# Patient Record
Sex: FEMALE | Race: WHITE | NOT HISPANIC OR LATINO
[De-identification: names, ages, dates, MRNs, and addresses within clinical notes are randomized per-mention and may not be internally consistent; named-entity substitution may affect disease eponyms.]

---

## 2023-12-06 PROBLEM — Z00.00 ENCOUNTER FOR PREVENTIVE HEALTH EXAMINATION: Status: ACTIVE | Noted: 2023-12-06

## 2023-12-11 ENCOUNTER — APPOINTMENT (OUTPATIENT)
Dept: ORTHOPEDIC SURGERY | Facility: CLINIC | Age: 58
End: 2023-12-11
Payer: COMMERCIAL

## 2023-12-11 VITALS — BODY MASS INDEX: 31.01 KG/M2 | HEIGHT: 63 IN | WEIGHT: 175 LBS

## 2023-12-11 DIAGNOSIS — Z78.9 OTHER SPECIFIED HEALTH STATUS: ICD-10-CM

## 2023-12-11 PROCEDURE — 73562 X-RAY EXAM OF KNEE 3: CPT | Mod: RT

## 2023-12-11 PROCEDURE — 99213 OFFICE O/P EST LOW 20 MIN: CPT

## 2023-12-11 RX ORDER — LOSARTAN POTASSIUM AND HYDROCHLOROTHIAZIDE 12.5; 1 MG/1; MG/1
100-12.5 TABLET ORAL
Refills: 0 | Status: ACTIVE | COMMUNITY

## 2023-12-11 RX ORDER — SIMVASTATIN 20 MG/1
20 TABLET, FILM COATED ORAL
Refills: 0 | Status: ACTIVE | COMMUNITY

## 2023-12-14 ENCOUNTER — APPOINTMENT (OUTPATIENT)
Dept: MRI IMAGING | Facility: CLINIC | Age: 58
End: 2023-12-14
Payer: COMMERCIAL

## 2023-12-14 PROCEDURE — 73721 MRI JNT OF LWR EXTRE W/O DYE: CPT | Mod: RT

## 2023-12-18 ENCOUNTER — APPOINTMENT (OUTPATIENT)
Dept: ORTHOPEDIC SURGERY | Facility: CLINIC | Age: 58
End: 2023-12-18
Payer: COMMERCIAL

## 2023-12-18 VITALS — BODY MASS INDEX: 31.01 KG/M2 | HEIGHT: 63 IN | WEIGHT: 175 LBS

## 2023-12-18 PROCEDURE — 99213 OFFICE O/P EST LOW 20 MIN: CPT | Mod: 25

## 2023-12-18 PROCEDURE — 20610 DRAIN/INJ JOINT/BURSA W/O US: CPT | Mod: RT

## 2023-12-18 NOTE — ASSESSMENT
[FreeTextEntry1] : I explained to her that this hopefully the shot will give her some temporary relief but she really needs to consider having arthroscopy of the knee due to the complex medial meniscus tear and patellofemoral symptoms on her scan she will consider that option and will be rechecked in a couple weeks unless she decides to schedule the procedure.

## 2023-12-18 NOTE — REASON FOR VISIT
[FreeTextEntry2] : Patient complains of increased R Knee pain since her last visit. Pain is constant and increases when walking. Patient would like to discuss MRI results.///// patient returns to the office in follow-up of her right knee she describes minimal improvement since the previous visit her MRI that was performed revealed a complex tear in the medial meniscus in addition to some degenerative changes in the patellofemoral joint with some lateral subluxation examination today reveals ambulates with a mild antalgic gait range of motion is limited by pain to 0 to 110 degrees.  She is tender on the medial joint line she also has some patellofemoral compression pain and lateral subluxation to examination

## 2023-12-18 NOTE — PROCEDURE
[FreeTextEntry3] : Mixture of 1 cc of lidocaine and 1 cc of dexamethasone mixed together the knee area was cleaned off with alcohol and sprayed with ethyl chloride and subsequently injection was inserted into her knee joint patient tolerated the procedure well and felt a little improvement after the shot

## 2023-12-18 NOTE — HISTORY OF PRESENT ILLNESS
[Gradual] : gradual [10] : 10 [Dull/Aching] : dull/aching [Sharp] : sharp [Constant] : constant [Nothing helps with pain getting better] : Nothing helps with pain getting better [Walking] : walking [] : Post Surgical Visit: no [de-identified] : 12/11/23 [de-identified] : Dr. Jansen [de-identified] : MRI

## 2024-01-18 ENCOUNTER — APPOINTMENT (OUTPATIENT)
Dept: ORTHOPEDIC SURGERY | Facility: HOSPITAL | Age: 59
End: 2024-01-18

## 2024-01-19 ENCOUNTER — APPOINTMENT (OUTPATIENT)
Dept: ORTHOPEDIC SURGERY | Facility: CLINIC | Age: 59
End: 2024-01-19

## 2024-01-26 ENCOUNTER — OUTPATIENT (OUTPATIENT)
Dept: OUTPATIENT SERVICES | Facility: HOSPITAL | Age: 59
LOS: 1 days | End: 2024-01-26
Payer: COMMERCIAL

## 2024-01-26 VITALS
DIASTOLIC BLOOD PRESSURE: 85 MMHG | WEIGHT: 181 LBS | TEMPERATURE: 98 F | OXYGEN SATURATION: 95 % | RESPIRATION RATE: 14 BRPM | HEIGHT: 63 IN | SYSTOLIC BLOOD PRESSURE: 147 MMHG | HEART RATE: 78 BPM

## 2024-01-26 DIAGNOSIS — S83.241A OTHER TEAR OF MEDIAL MENISCUS, CURRENT INJURY, RIGHT KNEE, INITIAL ENCOUNTER: ICD-10-CM

## 2024-01-26 DIAGNOSIS — Z98.890 OTHER SPECIFIED POSTPROCEDURAL STATES: Chronic | ICD-10-CM

## 2024-01-26 DIAGNOSIS — Z01.818 ENCOUNTER FOR OTHER PREPROCEDURAL EXAMINATION: ICD-10-CM

## 2024-01-26 DIAGNOSIS — Z98.891 HISTORY OF UTERINE SCAR FROM PREVIOUS SURGERY: Chronic | ICD-10-CM

## 2024-01-26 LAB
ALBUMIN SERPL ELPH-MCNC: 4.4 G/DL — SIGNIFICANT CHANGE UP (ref 3.3–5)
ALP SERPL-CCNC: 52 U/L — SIGNIFICANT CHANGE UP (ref 40–120)
ALT FLD-CCNC: 38 U/L — SIGNIFICANT CHANGE UP (ref 12–78)
ANION GAP SERPL CALC-SCNC: 1 MMOL/L — LOW (ref 5–17)
AST SERPL-CCNC: 21 U/L — SIGNIFICANT CHANGE UP (ref 15–37)
BILIRUB SERPL-MCNC: 0.4 MG/DL — SIGNIFICANT CHANGE UP (ref 0.2–1.2)
BUN SERPL-MCNC: 20 MG/DL — SIGNIFICANT CHANGE UP (ref 7–23)
CALCIUM SERPL-MCNC: 10 MG/DL — SIGNIFICANT CHANGE UP (ref 8.5–10.1)
CHLORIDE SERPL-SCNC: 110 MMOL/L — HIGH (ref 96–108)
CO2 SERPL-SCNC: 30 MMOL/L — SIGNIFICANT CHANGE UP (ref 22–31)
CREAT SERPL-MCNC: 0.76 MG/DL — SIGNIFICANT CHANGE UP (ref 0.5–1.3)
EGFR: 90 ML/MIN/1.73M2 — SIGNIFICANT CHANGE UP
GLUCOSE SERPL-MCNC: 98 MG/DL — SIGNIFICANT CHANGE UP (ref 70–99)
HCT VFR BLD CALC: 43.8 % — SIGNIFICANT CHANGE UP (ref 34.5–45)
HGB BLD-MCNC: 14.4 G/DL — SIGNIFICANT CHANGE UP (ref 11.5–15.5)
MCHC RBC-ENTMCNC: 30.8 PG — SIGNIFICANT CHANGE UP (ref 27–34)
MCHC RBC-ENTMCNC: 32.9 GM/DL — SIGNIFICANT CHANGE UP (ref 32–36)
MCV RBC AUTO: 93.8 FL — SIGNIFICANT CHANGE UP (ref 80–100)
NRBC # BLD: 0 /100 WBCS — SIGNIFICANT CHANGE UP (ref 0–0)
PLATELET # BLD AUTO: 224 K/UL — SIGNIFICANT CHANGE UP (ref 150–400)
POTASSIUM SERPL-MCNC: 4.1 MMOL/L — SIGNIFICANT CHANGE UP (ref 3.5–5.3)
POTASSIUM SERPL-SCNC: 4.1 MMOL/L — SIGNIFICANT CHANGE UP (ref 3.5–5.3)
PROT SERPL-MCNC: 7.5 G/DL — SIGNIFICANT CHANGE UP (ref 6–8.3)
RBC # BLD: 4.67 M/UL — SIGNIFICANT CHANGE UP (ref 3.8–5.2)
RBC # FLD: 13.2 % — SIGNIFICANT CHANGE UP (ref 10.3–14.5)
SODIUM SERPL-SCNC: 141 MMOL/L — SIGNIFICANT CHANGE UP (ref 135–145)
WBC # BLD: 4.5 K/UL — SIGNIFICANT CHANGE UP (ref 3.8–10.5)
WBC # FLD AUTO: 4.5 K/UL — SIGNIFICANT CHANGE UP (ref 3.8–10.5)

## 2024-01-26 PROCEDURE — 80053 COMPREHEN METABOLIC PANEL: CPT

## 2024-01-26 PROCEDURE — 93005 ELECTROCARDIOGRAM TRACING: CPT

## 2024-01-26 PROCEDURE — G0463: CPT

## 2024-01-26 PROCEDURE — 85027 COMPLETE CBC AUTOMATED: CPT

## 2024-01-26 PROCEDURE — 36415 COLL VENOUS BLD VENIPUNCTURE: CPT

## 2024-01-26 PROCEDURE — 93010 ELECTROCARDIOGRAM REPORT: CPT

## 2024-01-26 NOTE — H&P PST ADULT - HISTORY OF PRESENT ILLNESS
58 year old female PMH HTN, Hypercholesterolemia, COVID-19 (X3); now with  Other Tear Medial  Meniscus Current Injury RIGHT knee Initial Encounter; presents today for PST prior to RIGHT Knee Arthroscopy - Medial Meniscectomy- Lateral Release with Dr Joe Jansen on 2/1/2-24.     Pt notes back in June she went to both [place] and [place] and walked LOT over 20 days. Pt then notes  in October she began to develop RIGHT knee pain for which she began taking Aleve. Pt notes this gave her little relief and pain got worse and her right knee became swollen. Notes decreased ROM and strength to right knee. Notes pain to inner aspect of right knee along with difficulty walking and using stairs as well as being on her feet for long periods of time while at work. Pt has been wearing a brace on her right knee. Pt notes she went herself for consult with Dr Jansen then had an MRI which showed Meniscus tear. Pt notes she has had one prior Cortisone injection with relief noted for a while however pain then came back. Pt notes this is a quality of life issue for her and following discussions with Dr Jansen regarding treatment options pt is electing for scheduled procedure.

## 2024-01-26 NOTE — H&P PST ADULT - NSICDXPASTMEDICALHX_GEN_ALL_CORE_FT
PAST MEDICAL HISTORY:  2019 novel coronavirus disease (COVID-19)     Hypercholesterolemia     Hypertension     Other tear of medial meniscus, current injury, right knee, initial encounter

## 2024-01-26 NOTE — H&P PST ADULT - NSANTHOSAYNRD_GEN_A_CORE
No. BO screening performed.  STOP BANG Legend: 0-2 = LOW Risk; 3-4 = INTERMEDIATE Risk; 5-8 = HIGH Risk

## 2024-01-26 NOTE — H&P PST ADULT - PROBLEM SELECTOR PLAN 1
PST labs; CBC, CMP, EKG. Medical clearance scheduled with PCP Dr Terrance Gómez on 1/30/2024. Will fax over PST results to PCP for review and medical clearance. Pt instructed to stop any NSAIDS/Herbal Supplements between now and procedure. May take Tylenol if needed for any pain between now and procedure. No medications needed morning of procedure. She will continue her night medications as per her usual routine. Pre-op instructions as well as pre-op wash instructions given to pt with understanding verbalized. All questions addressed with pt prior to her leaving the PST department today.

## 2024-01-26 NOTE — H&P PST ADULT - MUSCULOSKELETAL COMMENTS
Notes herniated Lumbar Discs - also notes RIGHT knee pain/decreased ROM secondary to tear - SEE HPI RIGHT Knee Pain secondary to meniscus tear - decreased ROM and strength - increased pain to inner aspect of right knee  - pain when going up and down stairs

## 2024-01-26 NOTE — H&P PST ADULT - NSICDXFAMILYHX_GEN_ALL_CORE_FT
FAMILY HISTORY:  Father  Still living? No  Family history of coronary artery disease in father, Age at diagnosis: Age Unknown    Mother  Still living? Yes, Estimated age: 81-90  Family history of coronary artery disease in mother, Age at diagnosis: Age Unknown

## 2024-01-26 NOTE — H&P PST ADULT - ASSESSMENT
58 year old female PMH HTN, Hypercholesterolemia, COVID-19 (X3); now with  Other Tear Medial  Meniscus Current Injury RIGHT knee Initial Encounter; scheduled for RIGHT Knee Arthroscopy - Medial Meniscectomy- Lateral Release with Dr Joe Jansen on 2/1/2-24.     Pt notes back in June she went to both [place] and [place] and walked LOT over 20 days. Pt then notes  in October she began to develop RIGHT knee pain for which she began taking Aleve. Pt notes this gave her little relief and pain got worse and her right knee became swollen. Notes decreased ROM and strength to right knee. Notes pain to inner aspect of right knee along with difficulty walking and using stairs as well as being on her feet for long periods of time while at work. Pt has been wearing a brace on her right knee. Pt notes she went herself for consult with Dr Jansen then had an MRI which showed Meniscus tear. Pt notes she has had one prior Cortisone injection with relief noted for a while however pain then came back. Pt notes this is a quality of life issue for her and following discussions with Dr Jansen regarding treatment options pt is electing for scheduled procedure.

## 2024-01-29 RX ORDER — MORPHINE SULFATE 50 MG/1
4 CAPSULE, EXTENDED RELEASE ORAL ONCE
Refills: 0 | Status: DISCONTINUED | OUTPATIENT
Start: 2024-02-01 | End: 2024-02-15

## 2024-01-31 ENCOUNTER — TRANSCRIPTION ENCOUNTER (OUTPATIENT)
Age: 59
End: 2024-01-31

## 2024-01-31 NOTE — ASU PATIENT PROFILE, ADULT - FALL HARM RISK - UNIVERSAL INTERVENTIONS
Bed in lowest position, wheels locked, appropriate side rails in place/Call bell, personal items and telephone in reach/Instruct patient to call for assistance before getting out of bed or chair/Non-slip footwear when patient is out of bed/[place] to call system/Physically safe environment - no spills, clutter or unnecessary equipment/Purposeful Proactive Rounding/Room/bathroom lighting operational, light cord in reach

## 2024-02-01 ENCOUNTER — APPOINTMENT (OUTPATIENT)
Dept: ORTHOPEDIC SURGERY | Facility: HOSPITAL | Age: 59
End: 2024-02-01
Payer: COMMERCIAL

## 2024-02-01 ENCOUNTER — TRANSCRIPTION ENCOUNTER (OUTPATIENT)
Age: 59
End: 2024-02-01

## 2024-02-01 ENCOUNTER — OUTPATIENT (OUTPATIENT)
Dept: OUTPATIENT SERVICES | Facility: HOSPITAL | Age: 59
LOS: 1 days | End: 2024-02-01
Payer: COMMERCIAL

## 2024-02-01 ENCOUNTER — RESULT REVIEW (OUTPATIENT)
Age: 59
End: 2024-02-01

## 2024-02-01 VITALS
HEIGHT: 63 IN | RESPIRATION RATE: 16 BRPM | OXYGEN SATURATION: 96 % | DIASTOLIC BLOOD PRESSURE: 71 MMHG | WEIGHT: 181 LBS | SYSTOLIC BLOOD PRESSURE: 113 MMHG | HEART RATE: 85 BPM | TEMPERATURE: 99 F

## 2024-02-01 VITALS
HEART RATE: 87 BPM | DIASTOLIC BLOOD PRESSURE: 64 MMHG | RESPIRATION RATE: 17 BRPM | SYSTOLIC BLOOD PRESSURE: 114 MMHG | OXYGEN SATURATION: 97 %

## 2024-02-01 DIAGNOSIS — Z98.890 OTHER SPECIFIED POSTPROCEDURAL STATES: Chronic | ICD-10-CM

## 2024-02-01 DIAGNOSIS — Z98.891 HISTORY OF UTERINE SCAR FROM PREVIOUS SURGERY: Chronic | ICD-10-CM

## 2024-02-01 DIAGNOSIS — Z01.818 ENCOUNTER FOR OTHER PREPROCEDURAL EXAMINATION: ICD-10-CM

## 2024-02-01 DIAGNOSIS — S83.241A OTHER TEAR OF MEDIAL MENISCUS, CURRENT INJURY, RIGHT KNEE, INITIAL ENCOUNTER: ICD-10-CM

## 2024-02-01 PROCEDURE — 29873 ARTHRS KNEE SURG W/LAT RLS: CPT | Mod: RT

## 2024-02-01 PROCEDURE — 88304 TISSUE EXAM BY PATHOLOGIST: CPT

## 2024-02-01 PROCEDURE — 88304 TISSUE EXAM BY PATHOLOGIST: CPT | Mod: 26

## 2024-02-01 PROCEDURE — 29875 ARTHRS KNEE SURG SYNVCT LMTD: CPT | Mod: 59,RT

## 2024-02-01 PROCEDURE — 29881 ARTHRS KNE SRG MNISECTMY M/L: CPT | Mod: RT

## 2024-02-01 PROCEDURE — 29873 ARTHRS KNEE SURG W/LAT RLS: CPT | Mod: 59,RT

## 2024-02-01 RX ORDER — OXYCODONE HYDROCHLORIDE 5 MG/1
1 TABLET ORAL
Qty: 10 | Refills: 0
Start: 2024-02-01

## 2024-02-01 RX ORDER — LOSARTAN/HYDROCHLOROTHIAZIDE 100MG-25MG
1 TABLET ORAL
Refills: 0 | DISCHARGE

## 2024-02-01 RX ORDER — OXYCODONE HYDROCHLORIDE 5 MG/1
5 TABLET ORAL ONCE
Refills: 0 | Status: DISCONTINUED | OUTPATIENT
Start: 2024-02-01 | End: 2024-02-01

## 2024-02-01 RX ORDER — HYDROMORPHONE HYDROCHLORIDE 2 MG/ML
0.5 INJECTION INTRAMUSCULAR; INTRAVENOUS; SUBCUTANEOUS
Refills: 0 | Status: DISCONTINUED | OUTPATIENT
Start: 2024-02-01 | End: 2024-02-01

## 2024-02-01 RX ORDER — SODIUM CHLORIDE 9 MG/ML
1000 INJECTION, SOLUTION INTRAVENOUS
Refills: 0 | Status: DISCONTINUED | OUTPATIENT
Start: 2024-02-01 | End: 2024-02-01

## 2024-02-01 RX ORDER — METOCLOPRAMIDE HCL 10 MG
5 TABLET ORAL ONCE
Refills: 0 | Status: COMPLETED | OUTPATIENT
Start: 2024-02-01 | End: 2024-02-01

## 2024-02-01 RX ORDER — SIMVASTATIN 20 MG/1
1 TABLET, FILM COATED ORAL
Refills: 0 | DISCHARGE

## 2024-02-01 RX ADMIN — SODIUM CHLORIDE 50 MILLILITER(S): 9 INJECTION, SOLUTION INTRAVENOUS at 10:01

## 2024-02-01 RX ADMIN — Medication 5 MILLIGRAM(S): at 13:13

## 2024-02-01 RX ADMIN — HYDROMORPHONE HYDROCHLORIDE 0.5 MILLIGRAM(S): 2 INJECTION INTRAMUSCULAR; INTRAVENOUS; SUBCUTANEOUS at 12:09

## 2024-02-01 RX ADMIN — HYDROMORPHONE HYDROCHLORIDE 0.5 MILLIGRAM(S): 2 INJECTION INTRAMUSCULAR; INTRAVENOUS; SUBCUTANEOUS at 11:30

## 2024-02-01 RX ADMIN — HYDROMORPHONE HYDROCHLORIDE 0.5 MILLIGRAM(S): 2 INJECTION INTRAMUSCULAR; INTRAVENOUS; SUBCUTANEOUS at 11:54

## 2024-02-01 RX ADMIN — SODIUM CHLORIDE 120 MILLILITER(S): 9 INJECTION, SOLUTION INTRAVENOUS at 11:29

## 2024-02-01 RX ADMIN — HYDROMORPHONE HYDROCHLORIDE 0.5 MILLIGRAM(S): 2 INJECTION INTRAMUSCULAR; INTRAVENOUS; SUBCUTANEOUS at 11:45

## 2024-02-01 NOTE — ASU DISCHARGE PLAN (ADULT/PEDIATRIC) - NS MD DC FALL RISK RISK
For information on Fall & Injury Prevention, visit: [email or web address] OR  [email or web address] OR  https://www.cdc.gov/steadi/patient.html

## 2024-02-01 NOTE — ASU DISCHARGE PLAN (ADULT/PEDIATRIC) - ASU DC SPECIAL INSTRUCTIONSFT
Knee Arthroscopy Instructions    1) Your knee will swell over the next 48hours and you can expect pain to get a bit worse. Ice your Knee plenty, continuously if possible. Fill up a plastic bag, then wrap it in a towel or pillow case.     2) Elevate your leg above your heart with about 3 pillows when you can, when you are in bed or chair. Otherwise you should be up and about, walking as much as you can tolereate. The more you move the better you will do. Weight bearing as tolerated.    3) Expect some bloody drainage.      4) Dressing to be changed tomorrow during the office visit     5) Only reason to worry would be if pain got so severe that you cannot feel or wiggle your toes, or if your foot is cold. In this case you need to call or come to the ER. But as long as you can feel and wiggle your toes, you are fine.    6) Office appointment set up for tomorrow     7) A pain Rx is in the chart; fill it on your way home. OR was sent electronically to your pharmacy, pick it up on the way home. Knee Arthroscopy Instructions    1) Your knee will swell over the next 48hours and you can expect pain to get a bit worse. Ice your Knee plenty, continuously if possible. Fill up a plastic bag, then wrap it in a towel or pillow case.     2) Elevate your leg above your heart with about 3 pillows when you can, when you are in bed or chair. Otherwise you should be up and about, walking as much as you can tolerate. The more you move the better you will do. Weight bearing as tolerated.    3) Expect some bloody drainage.      4) Dressing to be changed tomorrow during the office visit     5) Only reason to worry would be if pain got so severe that you cannot feel or wiggle your toes, or if your foot is cold. In this case you need to call or come to the ER. But as long as you can feel and wiggle your toes, you are fine.    6) Office appointment set up for tomorrow     7) A pain Rx is in the chart; fill it on your way home. OR was sent electronically to your pharmacy, pick it up on the way home.

## 2024-02-01 NOTE — ASU DISCHARGE PLAN (ADULT/PEDIATRIC) - CARE PROVIDER_API CALL
Joe Jansen  Orthopaedic Surgery  [address] [place] [address]  Phone: [phone]  Fax: [phone]  Established Patient  Follow Up Time:

## 2024-02-02 ENCOUNTER — APPOINTMENT (OUTPATIENT)
Dept: ORTHOPEDIC SURGERY | Facility: CLINIC | Age: 59
End: 2024-02-02
Payer: COMMERCIAL

## 2024-02-02 VITALS — BODY MASS INDEX: 31.01 KG/M2 | HEIGHT: 63 IN | WEIGHT: 175 LBS

## 2024-02-02 PROBLEM — U07.1 COVID-19: Chronic | Status: ACTIVE | Noted: 2024-01-26

## 2024-02-02 PROBLEM — E78.00 PURE HYPERCHOLESTEROLEMIA, UNSPECIFIED: Chronic | Status: ACTIVE | Noted: 2024-01-26

## 2024-02-02 PROCEDURE — 99024 POSTOP FOLLOW-UP VISIT: CPT

## 2024-02-02 NOTE — ASSESSMENT
[FreeTextEntry1] : Patient is dressing is changed and she will maintain Band-Aids over the portals which will be changed once a day will begin therapy on Monday and return in 1 week for follow-up and suture removal she will take Aleve twice a day and take the pain medicine if necessary at night.

## 2024-02-02 NOTE — HISTORY OF PRESENT ILLNESS
[] : no [FreeTextEntry1] : R Knee  [de-identified] : 12/18/2023 [de-identified] : Dr. Jansen  [de-identified] : 2/1/2024 [de-identified] : MRI [de-identified] : 2/1/2024 [de-identified] : R Knee Arthroscopy

## 2024-02-02 NOTE — HISTORY OF PRESENT ILLNESS
[] : no [FreeTextEntry1] : R Knee  [de-identified] : 12/18/2023 [de-identified] : Dr. Jansen  [de-identified] : 2/1/2024 [de-identified] : MRI [de-identified] : 2/1/2024 [de-identified] : R Knee Arthroscopy

## 2024-02-02 NOTE — REASON FOR VISIT
[Family Member] : family member [FreeTextEntry2] : First Post Op Patient returns to the office 1 day postop her right knee surgery yesterday her pain is relatively mild she is using cane to ambulate with a mild antalgic gait dressing is changed and her wounds are benign range of motion is 0 to 80 degrees she has good patellofemoral tracking SX 2/1/2024       Patient returns to the office 1 day status post her right knee surgery she is using a cane to ambulate with a mild antalgic gait her dressing is changed and her wound is benign with mild swelling she can flex her knee from 0 to approximately 80 degrees.  Quad strength 3 out of 5 good patellofemoral tracking

## 2024-02-05 LAB — SURGICAL PATHOLOGY STUDY: SIGNIFICANT CHANGE UP

## 2024-02-09 ENCOUNTER — APPOINTMENT (OUTPATIENT)
Dept: ORTHOPEDIC SURGERY | Facility: CLINIC | Age: 59
End: 2024-02-09
Payer: COMMERCIAL

## 2024-02-09 VITALS — WEIGHT: 175 LBS | BODY MASS INDEX: 31.01 KG/M2 | HEIGHT: 63 IN

## 2024-02-09 PROBLEM — I10 ESSENTIAL (PRIMARY) HYPERTENSION: Chronic | Status: ACTIVE | Noted: 2024-01-26

## 2024-02-09 PROBLEM — S83.241A OTHER TEAR OF MEDIAL MENISCUS, CURRENT INJURY, RIGHT KNEE, INITIAL ENCOUNTER: Chronic | Status: ACTIVE | Noted: 2024-01-26

## 2024-02-09 PROCEDURE — 99024 POSTOP FOLLOW-UP VISIT: CPT

## 2024-02-09 NOTE — REASON FOR VISIT
[FreeTextEntry2] : 2ND POST APPT  SX 2/1/2024 Patient returns to the office 1 week status post her right knee arthroscopic surgery.  Presently her pain is minimal and she is gradually progressing with her physical therapy examination today reveals ambulates with a mild antalgic gait when she slows her gait down she walks fairly normally right knee has minimal swelling with range of motion 0 to 120 degrees she has no gross instability or meniscal signs

## 2024-02-09 NOTE — HISTORY OF PRESENT ILLNESS
[Full time] : Work status: full time [] : no [FreeTextEntry1] : R Knee  [de-identified] : 2/2/2024 [de-identified] : Dr. 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

## 2024-02-09 NOTE — HISTORY OF PRESENT ILLNESS
[Full time] : Work status: full time [] : no [FreeTextEntry1] : R Knee  [de-identified] : 2/2/2024 [de-identified] : Dr. 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

## 2024-02-09 NOTE — ASSESSMENT
[FreeTextEntry1] : Patient is doing very well 1 week postop her knee surgery.  She will continue working with the physical therapist to increase her motion and strength and normalize her gait her sutures are removed today and her wounds are benign she will follow-up in approximately 2 weeks.  She will likely be returned to work after next visit.

## 2024-02-14 ENCOUNTER — NON-APPOINTMENT (OUTPATIENT)
Age: 59
End: 2024-02-14

## 2024-02-23 ENCOUNTER — APPOINTMENT (OUTPATIENT)
Dept: ORTHOPEDIC SURGERY | Facility: CLINIC | Age: 59
End: 2024-02-23
Payer: COMMERCIAL

## 2024-02-23 VITALS — HEIGHT: 63 IN | BODY MASS INDEX: 31.01 KG/M2 | WEIGHT: 175 LBS

## 2024-02-23 PROCEDURE — 99213 OFFICE O/P EST LOW 20 MIN: CPT | Mod: 24

## 2024-02-23 NOTE — ASSESSMENT
[FreeTextEntry1] : Patient is doing very well 2 weeks postop her knee surgery.  She will continue working with the physical therapist to increase her motion and strength and normalize her gait  Since she does a lot of walking at her job she cannot do that at this point in time she will continue to work from home and return in 2 weeks hopefully will be able to get her to return to work after her next visit even if we have to limit some activities.

## 2024-02-23 NOTE — REASON FOR VISIT
[FreeTextEntry2] : 3RD POST APPT  SX 2/1/2024 Patient returns to the office approximately 2 weeks status post her knee surgery presently her pain is minimal and she is ambulate with a slight antalgic gait swelling is minimal and range of motion is 0 to 120 degrees she has no gross instability or meniscal signs quad strength is 4 out of 5

## 2024-02-23 NOTE — HISTORY OF PRESENT ILLNESS
[Tightness] : tightness [Intermittent] : intermittent [Household chores] : household chores [Leisure] : leisure [Rest] : rest [Physical therapy] : physical therapy [Full time] : Work status: full time [] : no [FreeTextEntry1] : R Knee  [de-identified] : 2/9/2024 [de-identified] : Dr. 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

## 2024-03-08 ENCOUNTER — APPOINTMENT (OUTPATIENT)
Dept: ORTHOPEDIC SURGERY | Facility: CLINIC | Age: 59
End: 2024-03-08
Payer: COMMERCIAL

## 2024-03-08 VITALS — WEIGHT: 175 LBS | HEIGHT: 63 IN | BODY MASS INDEX: 31.01 KG/M2

## 2024-03-08 DIAGNOSIS — S83.241A OTHER TEAR OF MEDIAL MENISCUS, CURRENT INJURY, RIGHT KNEE, INITIAL ENCOUNTER: ICD-10-CM

## 2024-03-08 PROCEDURE — 99213 OFFICE O/P EST LOW 20 MIN: CPT | Mod: 24

## 2024-03-08 NOTE — REASON FOR VISIT
[FreeTextEntry2] : 3RD POST APPT  SX 2/1/2024 Patient returns to the office in follow-up regarding her right knee she is doing much better since last visit but is still not returned to work in person examination of the right knee today reveals minimal swelling with wounds healing nicely range of motion is 0 to 125 degrees Quad strength is 4 out of 5 she now has her patella taped and she is to be having improved alignment with that and is also having less pain and better ambulation. Patient returns to the office approximately 2 weeks status post her knee surgery presently her pain is minimal and she is ambulate with a slight antalgic gait swelling is minimal and range of motion is 0 to 120 degrees she has no gross instability or meniscal signs quad strength is 4 out of 5

## 2024-03-08 NOTE — HISTORY OF PRESENT ILLNESS
[Tightness] : tightness [Intermittent] : intermittent [Household chores] : household chores [Leisure] : leisure [Physical therapy] : physical therapy [Rest] : rest [Full time] : Work status: full time [] : no [FreeTextEntry1] : R Knee  [de-identified] : 2/23/2024 [de-identified] : Dr. 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

## 2024-03-08 NOTE — ASSESSMENT
[FreeTextEntry1] : Patient is doing very well 4weeks postop her knee surgery.  She will continue working with the physical therapist to increase her motion and strength and normalize her gait  Since she does a lot of walking at her job she cannot do that at this point in time she will continue to work from home and return to work 3/20  Short-acting recheck.  She will be rechecked in 3 to 4 weeks after returning to her job to her job to monitor her progress and functional level..

## 2024-04-29 ENCOUNTER — APPOINTMENT (OUTPATIENT)
Dept: ORTHOPEDIC SURGERY | Facility: CLINIC | Age: 59
End: 2024-04-29

## 2024-11-18 NOTE — ASU PREOP CHECKLIST - CHLOROHEXIDINE WASH 1
Outpatient Occupational Therapy  DAILY TREATMENT     [facility] Occupational Therapy [address]  [address]  [place] [address]  Phone:  [phone]  Fax:  [phone]    Date: 11/19/2024    Patient: Kristin Balderrama  YOB: 1990  MRN: [id number]     Time Calculation  Start time: 0100  Stop time: 0145 Time Calculation (min): 45 minutes         Chief Complaint: Loss Of Balance, Extremity Weakness, and Self Care Duties    Visit #: 7    SUBJECTIVE:  I am going to get an infusion to work on my migraine.     OBJECTIVE:  Current objective measures:    Postural Observations  Seated posture: fair  Standing posture: fair  Correction of posture: makes symptoms better        Neurological Testing   Sensation   Wrist/Hand   Left   Hypersensation: light touch   Right   Hypersensation: light touch     Bilateral hand hypersensitivity; especially with hot and cold  Quick response to therapist tapping hands.       Active Range of Motion   Normal active range of motion for all right UE except shoulder     Right Shoulder   Flexion: 180 degrees   Extension: WFL  Abduction: 1400 degrees   Adduction: WFL  External rotation 0°: WFL     Additional Active Range of Motion Details  Patient stated she has had a right rotator cuff tear in the past   Some hypermobility noted; however quite minimal on examination      Scapular Mobility   Left Shoulder   Scapular Mobility with Shoulder to 90° FF   Scapular winging: minimal     Right Shoulder   Scapular Mobility with Shoulder to 90° FF   Scapular winging: minimal     Strength      Left Wrist/Hand    (2nd hand position)     Trial 1: 62     Right Wrist/Hand    (2nd hand position)     Trial 1: 60     Tests      Left Wrist/Hand   Negative CMC grind, distal radial-ulnar joint stress, extrinsic extensor tightness and extrinsic flexor tightness.      Right Wrist/Hand   Negative CMC grind, distal radial-ulnar joint stress, extrinsic extensor tightness and  extrinsic flexor tightness.      Left Elbow   Negative Cozen's.      Right Elbow   Negative Cozen's.      General Comments      Wrist/Hand Comments  9 hole peg test:  R=18 seconds  L = 19 seconds      Activities of Daily Living:      Bathing:      Bathing comments:   Unable to tolerate standing in the small step in shower cubicle and tending to sponge bathe.     Dressing:     Lower body adaptive equipment used: long shoe horn, reacher, dressing stick and sock aid     Patient wanting to utilize her Body Braid to stabilize her joints during ADLs and currently is Mod I with UB and max A with lower body.       ADL Comments:  Uses w/c on occasion (3-4 x a year).  Mod I dressing.   Small step in shower. Difficult to use so normally sponge bathes  Has a commode which OTR recommended she use over the toilet as a toilet frame to assist with toilet transfers.    Unable to stand more than 5 minutes at a counter during a functional task     Improving sleep hygiene = able to get about 5 hours continuous sleep  Labored breathing, often holding breath during functional tasks at  time of evaluation.        Therapeutic Exercises (CPT 94848):     1. AROM of right shoulder    2. AAROM of right shoulder    3. Shoulder arc to enhance shouler AROM    4. medium resistance theraband for gross UE and upper back strengthening    5. medium resistance theraputty for hand strengthening    6. flex bar - green - firm    7. 11# graded clip, 10 x each hand    8. 9# digi-flex, 10 x each hand    9. fine motor manipulation and dexterity, 9 hole peg test, grooved pegboard, manipulation of coins and use of screwdriver and tweezers    Therapeutic Exercise Summary:         Therapeutic Treatments and Modalities:    1. Therapeutic Activities (CPT 36949)    Therapeutic Treatments and Modalities Summary: Dynamic standing activity of lifting laundry basket of clothing from counter and carrying to elevated mat, folding laundry and placing back in the basket and  carried back to counter.  Then walked over to [place] and completed reaching activity.  Total standing time of 13 minutes and 30 seconds  with therapeutic rest  at end and utilizing diaphragmatic breathing     Time-based treatments/modalities:  Therapeutic exercise minutes (CPT 18605): 30 minutes  Therapeutic activity minutes (CPT 26038): 15 minutes        Pain rating before treatment: 5  Pain rating after treatment: 6    ASSESSMENT:   Response to treatment: patient continues to improve in therapy with improved standing tolerance and actively utilizing diaphragmatic breathing during activities     PLAN/RECOMMENDATIONS:   Plan for treatment: therapy treatment to continue next visit.  Planned interventions for next visit: continue with current treatment, hot/cold pack therapy (CPT 75752), self care ADL training (CPT 29235), therapeutic activities (CPT 32119), and therapeutic exercise (CPT 85574)          30-Jan-2024

## (undated) DEVICE — VENODYNE/SCD SLEEVE CALF MEDIUM

## (undated) DEVICE — GLV 8 PROTEXIS (BLUE)

## (undated) DEVICE — SHAVER BLADE LINVATEC FULL RADIUS RESECTOR 3.5MM

## (undated) DEVICE — DRAPE 3/4 SHEET W REINFORCEMENT 56X77"

## (undated) DEVICE — TUBING DEPUY MITEK FMS INFLOW

## (undated) DEVICE — WARMING BLANKET UPPER ADULT

## (undated) DEVICE — FLOORMAT SURGISAFE ABSORBANT WHITE 36" X 72"

## (undated) DEVICE — VENODYNE/SCD SLEEVE CALF LARGE

## (undated) DEVICE — PLV-SCD MACHINE: Type: DURABLE MEDICAL EQUIPMENT

## (undated) DEVICE — SUT MONOSOF 3-0 18" P-12

## (undated) DEVICE — SHAVER BLADE GREAT WHITE 4.2MM

## (undated) DEVICE — SOL IRR BAG NS 0.9% 3000ML

## (undated) DEVICE — NDL HYPO SAFE 22G X 1.5" (BLACK)

## (undated) DEVICE — GLV 7.5 PROTEXIS (WHITE)

## (undated) DEVICE — TUBING DEPUY MITEK FMS OUTFLOW

## (undated) DEVICE — PACK KNEE ARTHROSCOPY

## (undated) DEVICE — LAP PAD W RING 18 X 18"